# Patient Record
Sex: FEMALE | NOT HISPANIC OR LATINO
[De-identification: names, ages, dates, MRNs, and addresses within clinical notes are randomized per-mention and may not be internally consistent; named-entity substitution may affect disease eponyms.]

---

## 2020-06-08 PROBLEM — Z00.00 ENCOUNTER FOR PREVENTIVE HEALTH EXAMINATION: Status: ACTIVE | Noted: 2020-06-08

## 2020-07-02 ENCOUNTER — APPOINTMENT (OUTPATIENT)
Dept: PHYSICAL MEDICINE AND REHAB | Facility: CLINIC | Age: 50
End: 2020-07-02
Payer: COMMERCIAL

## 2020-07-02 VITALS
HEIGHT: 63 IN | DIASTOLIC BLOOD PRESSURE: 88 MMHG | SYSTOLIC BLOOD PRESSURE: 121 MMHG | TEMPERATURE: 97.7 F | HEART RATE: 71 BPM | BODY MASS INDEX: 26.58 KG/M2 | WEIGHT: 150 LBS | OXYGEN SATURATION: 98 %

## 2020-07-02 DIAGNOSIS — Z78.9 OTHER SPECIFIED HEALTH STATUS: ICD-10-CM

## 2020-07-02 DIAGNOSIS — M25.571 PAIN IN RIGHT ANKLE AND JOINTS OF RIGHT FOOT: ICD-10-CM

## 2020-07-02 DIAGNOSIS — M75.81 OTHER SHOULDER LESIONS, RIGHT SHOULDER: ICD-10-CM

## 2020-07-02 DIAGNOSIS — Z80.1 FAMILY HISTORY OF MALIGNANT NEOPLASM OF TRACHEA, BRONCHUS AND LUNG: ICD-10-CM

## 2020-07-02 DIAGNOSIS — S93.401A SPRAIN OF UNSPECIFIED LIGAMENT OF RIGHT ANKLE, INITIAL ENCOUNTER: ICD-10-CM

## 2020-07-02 DIAGNOSIS — M75.01 ADHESIVE CAPSULITIS OF RIGHT SHOULDER: ICD-10-CM

## 2020-07-02 PROCEDURE — 99204 OFFICE O/P NEW MOD 45 MIN: CPT
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
## 2020-07-02 NOTE — ASSESSMENT
[FreeTextEntry1] : \par \par \par Patient is 50 year old female who presents with fall 3 weeks prior and right ankle sprain/strain, swelling, pain and right shoulder RTC tendonitis and adhesive capsulitis\par \par 1. OT 2 x week x 8 weeks for MHP/TENS/US to right shoulder, ROM, strengthening, ergonomics, HEP\par \par 2. PT 2 x week x 8 weeks for cold compress/TENs, ROM/stretch, DF/Eversion strengthening, proprioception, HEP\par \par 3. continue voltaern gel topical for pain relief\par \par 4. If no improvement with OT, will consider MRI right shoulder eval RTC and r/o bursitis/possible candidate for shoulder injection, discussed with patient\par \par 5. f/u 6 weeks

## 2020-07-02 NOTE — PHYSICAL EXAM
[FreeTextEntry1] : PE:O x 3 grossly no facial droop\par \par cervical ROM; FF 35 ext 30\par right lateral flexoin 35 left 35\par right lateral rotation 40 and left 40\par negativ eSpurlings\par \par bilateral upper nad mid trap tightness, mild TTP no trigger points palapted\par \par ext: +TTP anterior shoulder and subacromial bursa\par right shoulder FF limited 95 degrees abduction 80\par right shoulder ER 35 IR 20\par +reduced extension and IR\par no drop arm\par +Neer's\par +Hawkin's\par shoulder 5-/5 elbow flexion/extension right wrist flexion/ext/intrinsic/gross grasp 5/5\par left shoulder ROM WFL in all planes\par left shoulder, elbow flexion/extension, gross grasp and wrist flexion and extension and intrinsic 5/5\par \par bilateral HF, quad. ham and ankle PF and DF 5/5\par negative SLR\par +right ankle +lateral malleolar swelling and TTP no ecchymose\par increased laxity with medial sstress\par mild pronation right foot\par no deltoid ligmanet TTP\par no left anlke TTP or swelling\par \par neuro: no sensory defincits LT\par DTRs 1+ BR. biceps, knees
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
## 2020-08-20 ENCOUNTER — APPOINTMENT (OUTPATIENT)
Dept: PHYSICAL MEDICINE AND REHAB | Facility: CLINIC | Age: 50
End: 2020-08-20

## 2021-12-23 ENCOUNTER — TRANSCRIPTION ENCOUNTER (OUTPATIENT)
Age: 51
End: 2021-12-23

## 2025-08-12 ENCOUNTER — APPOINTMENT (OUTPATIENT)
Dept: OTOLARYNGOLOGY | Facility: CLINIC | Age: 55
End: 2025-08-12

## 2025-08-12 VITALS
WEIGHT: 155 LBS | DIASTOLIC BLOOD PRESSURE: 87 MMHG | HEART RATE: 70 BPM | HEIGHT: 64 IN | SYSTOLIC BLOOD PRESSURE: 118 MMHG | BODY MASS INDEX: 26.46 KG/M2

## 2025-08-12 DIAGNOSIS — K21.9 GASTRO-ESOPHAGEAL REFLUX DISEASE W/OUT ESOPHAGITIS: ICD-10-CM

## 2025-08-12 PROCEDURE — 99204 OFFICE O/P NEW MOD 45 MIN: CPT | Mod: 25

## 2025-08-12 PROCEDURE — 31579 LARYNGOSCOPY TELESCOPIC: CPT

## 2025-08-12 RX ORDER — FAMOTIDINE 20 MG/1
20 TABLET, FILM COATED ORAL
Qty: 60 | Refills: 0 | Status: ACTIVE | COMMUNITY
Start: 2025-08-12 | End: 1900-01-01